# Patient Record
Sex: MALE | Race: WHITE | URBAN - METROPOLITAN AREA
[De-identification: names, ages, dates, MRNs, and addresses within clinical notes are randomized per-mention and may not be internally consistent; named-entity substitution may affect disease eponyms.]

---

## 2019-09-30 ENCOUNTER — OFFICE VISIT (OUTPATIENT)
Dept: SURGERY | Facility: CLINIC | Age: 59
End: 2019-09-30
Payer: COMMERCIAL

## 2019-09-30 VITALS
TEMPERATURE: 99 F | HEIGHT: 72 IN | WEIGHT: 204 LBS | DIASTOLIC BLOOD PRESSURE: 85 MMHG | HEART RATE: 63 BPM | BODY MASS INDEX: 27.63 KG/M2 | SYSTOLIC BLOOD PRESSURE: 139 MMHG

## 2019-09-30 DIAGNOSIS — L29.0 PRURITUS ANI: ICD-10-CM

## 2019-09-30 DIAGNOSIS — K64.2 PROLAPSED INTERNAL HEMORRHOIDS, GRADE 3: Primary | ICD-10-CM

## 2019-09-30 PROCEDURE — 99243 OFF/OP CNSLTJ NEW/EST LOW 30: CPT | Performed by: COLON & RECTAL SURGERY

## 2019-09-30 PROCEDURE — 46600 DIAGNOSTIC ANOSCOPY SPX: CPT | Performed by: COLON & RECTAL SURGERY

## 2019-09-30 NOTE — PATIENT INSTRUCTIONS
Jason Baez presents today in consultation of Dr. Roma Hall for perianal itching and hemorrhoids. The patient states that approximately 1-1/2 years ago he began to develop perianal itching.   He states that it has become progressively more severe and fr hemorrhoids that are likely contributing to the perianal itching. It was also discussed with the patient that his diet likely is a component to his perianal itching as well. The patient was provided a pruritus a night patient education sheet.   That she

## 2019-10-01 NOTE — PROCEDURES
Procedure:  Anoscopy    Surgeon: Jericho Sarah    Anesthesia: None    Findings: See the progress note attached for all findings    Operative Summary: The patient was placed in a prone position on the proctoscopy table, the hips were flexed in the jackknife p

## 2019-11-04 ENCOUNTER — OFFICE VISIT (OUTPATIENT)
Dept: SURGERY | Facility: CLINIC | Age: 59
End: 2019-11-04
Payer: COMMERCIAL

## 2019-11-04 VITALS
TEMPERATURE: 98 F | BODY MASS INDEX: 27.63 KG/M2 | SYSTOLIC BLOOD PRESSURE: 145 MMHG | WEIGHT: 204 LBS | HEIGHT: 72 IN | DIASTOLIC BLOOD PRESSURE: 81 MMHG | HEART RATE: 57 BPM

## 2019-11-04 DIAGNOSIS — L29.0 PRURITUS ANI: Primary | ICD-10-CM

## 2019-11-04 DIAGNOSIS — K64.2 PROLAPSED INTERNAL HEMORRHOIDS, GRADE 3: ICD-10-CM

## 2019-11-04 PROCEDURE — 99213 OFFICE O/P EST LOW 20 MIN: CPT | Performed by: COLON & RECTAL SURGERY

## 2019-11-04 NOTE — PATIENT INSTRUCTIONS
I am seeing this patient in further consultation regarding advanced pruritus ani identified on prior examination. The patient also had grade 3 internal hemorrhoids.     I am happy to report that with topical treatment, hygiene, and dietary modifications, t

## 2019-11-04 NOTE — PROGRESS NOTES
Follow Up Visit Note       Active Problems      1. Pruritus ani    2.  Prolapsed internal hemorrhoids, grade 3          Chief Complaint   Patient presents with:  Anal Problem (GI): cont care for puritis ani - states that symptoms have been better since the reviewed by me during today. Review of Systems   Constitutional: Negative for chills, diaphoresis, fatigue, fever and unexpected weight change. HENT: Negative for hearing loss, nosebleeds, sore throat and trouble swallowing.     Respiratory: Negative for current itching, diarrhea or constipation. We were contemplating rubber band treatment to his hemorrhoids if his symptoms did not resolve. Clinical examination reveals the skin of the anus to be completely healed.   There are no signs of pruritus ani

## 2022-08-30 NOTE — H&P
New Patient Visit Note       Active Problems      1. Prolapsed internal hemorrhoids, grade 3    2.  Pruritus ani        Chief Complaint   Patient presents with:  Hemorrhoids: NP hemorrhoid consult, pt denies pain or bleeding, wnl bm      History of Present history, independently performed a physical exam, and developed an assessment and plan. The physician performed all medical decision making.     Mary Kay Villafana PA-C    I agree with the note as scribed by the PA  I performed my own physical exam and obtai urgency. Musculoskeletal: Negative for arthralgias and myalgias. Skin: Negative for color change and rash. Neurological: Negative for tremors, syncope and weakness. Hematological: Negative for adenopathy. Does not bruise/bleed easily.    Psychiatric Procedures:  Anoscopy    Clinical examination of the rectum including anoscopy reveals no external hemorrhoids or fissures. The patient does have circumferential grade 3 internal hemorrhoids. There is no evidence of any Crohn's disease or proctitis.   Pro having any pain with bowel movements. The patient has no other significant past surgical history. The patient has no significant past medical history.     The patient denies any first or second-degree family history of colorectal cancer or uterine cance 0

## 2023-12-18 NOTE — LETTER
19    Patient: Rj Dejesus  : 1960 Visit date: 2019    Dear  Dr. Raine Leach,    Thank you for referring Rj Dejesus to my practice. Please find my assessment and plan below.              Assessment   Prolapsed internal hemorrhoids, g Subjective   Patient ID: Kentrell Munguia is a 65 y.o. male with a history of hypertension, DAVID, pulmonary nodules, current cigarette smoker, dyslipidemia, and prostate cancer who presents for Follow-up    HPI the patient's blood pressure is well-controlled which is in between 120-130/70-80 mmHg as reported by the patient.    He had CT chest low-dose a month ago which is stable.  He smokes 1 pack in 3-4 days.  Has upcoming appointment with pulmonologist in 3 weeks.  He is on a low-carb diet and has lost a few pounds.  He does not monitor his blood glucose.  The patient stopped taking tamsulosin stating that it was never refilled by his urology and he thought he does not need to take it anymore.  He wakes up to urinate once at night.  Denies hematuria.    Review of Systems   Constitutional:  Negative for appetite change, chills, fatigue and fever.   HENT:  Negative for congestion, ear pain, facial swelling, hearing loss, nosebleeds and sore throat.    Eyes:  Negative for pain, discharge and visual disturbance.   Respiratory:  Negative for cough, choking, chest tightness, shortness of breath and wheezing.    Cardiovascular:  Negative for chest pain, palpitations and leg swelling.   Gastrointestinal:  Negative for abdominal distention, abdominal pain, anal bleeding, constipation, diarrhea, nausea and vomiting.   Endocrine: Negative for cold intolerance, heat intolerance, polydipsia, polyphagia and polyuria.   Genitourinary:  Negative for difficulty urinating, frequency, hematuria and urgency.   Musculoskeletal:  Negative for arthralgias, gait problem, joint swelling and myalgias.   Skin:  Negative for color change and rash.   Neurological:  Negative for dizziness, tremors, syncope, weakness, numbness and headaches.   Psychiatric/Behavioral:  Negative for behavioral problems, confusion, sleep disturbance and suicidal ideas. The patient is not nervous/anxious.        Objective   /60   Temp 36.1 °C (96.9 °F)   Ht  "1.74 m (5' 8.5\")   Wt 99.8 kg (220 lb)   BMI 32.96 kg/m²     Physical Exam  Constitutional:       General: He is not in acute distress.     Appearance: Normal appearance.   HENT:      Head: Normocephalic and atraumatic.      Nose: Nose normal.   Eyes:      Extraocular Movements: Extraocular movements intact.      Conjunctiva/sclera: Conjunctivae normal.      Pupils: Pupils are equal, round, and reactive to light.   Cardiovascular:      Rate and Rhythm: Normal rate and regular rhythm.      Pulses: Normal pulses.      Heart sounds: Normal heart sounds.      Comments: Varicose veins of lower extremities b/l  Pulmonary:      Effort: Pulmonary effort is normal.      Breath sounds: Normal breath sounds.   Abdominal:      General: Bowel sounds are normal.      Palpations: Abdomen is soft.   Musculoskeletal:         General: Normal range of motion.      Cervical back: Normal range of motion and neck supple.   Neurological:      General: No focal deficit present.      Mental Status: He is alert and oriented to person, place, and time.   Psychiatric:         Mood and Affect: Mood normal.         Behavior: Behavior normal.         Thought Content: Thought content normal.         Judgment: Judgment normal.         Assessment/Plan     High blood pressure   Blood pressure is well controlled  Continue doxazosin 4 mg at bedtime  Continue amlodipine 10 mg once daily   Continue losartan-HCTZ 100-12.5 mg daily  No added salt diet, do not add salt to your food   Try to exercise every day for 30 minutes    Prediabetes  Last hemoglobin A1c 5.7  Continue low-carb diet  Try to exercise 30 minutes daily     Benign bone lesion of left hip  continue surveillance   Follow up with orthopedic oncology Dr. Hillman     Smoking addiction  Smokes 1 pack in 3-4 days  CT chest low-dose 11/20/2023, stable pulmonary nodules  ultrasound abdominal aorta normal      Pulmonary nodules  stable  Repeat CT chest in November 2023  Follow up with pulmonology as " Clinical examination of the rectum including anoscopy reveals no external hemorrhoids or fissures. The patient does have circumferential grade 3 internal hemorrhoids. There is no evidence of any Crohn's disease or proctitis.   Prostate is normal.  There a scheduled     Obstructive sleep apnea  APAP, declined     Prostate cancer  stable  Off of Tamsulosin 0.4 mg daily  Follow up with urology as scheduled     CAD  Continue Rosuvastatin 20 mg at bed time  Continue Aspirin 81 mg daily  Try to lose weight  Exercise at least 30 minutes daily  Low-fat diet recommended  Stress test in 2020 normal  Follow-up with cardiology Dr. Acevedo      Hyperlipidemia  Continue Rosuvastatin 20 mg at bed time  low fat, low cholesterol diet  I recommend Mediterranean diet, which include fish, chicken, vegetables and olive oil  Exercise daily for 30 minutes daily or at least 3 times a week     Elevated hematocrit and red blood cells  Most likely due to smoking  Patient is not interested in quitting smoking  keep monitoring  We have obtained CBC today     Multiple colon polyps  Colonoscopy done in May 2021, repeat colonoscopy in 3 years    Continue multivitamins    BMI 33.86 kg/m²  Try to have as many home cooked meals as possible  Avoid processed foods which contain excess calories, sugar, and sodium.  Exercise 30 minutes daily    Health maintenance  Colonoscopy May 2021, repeat colonoscopy in 3 years  Prevnar 13 done in 2021, Prevnar 20 recommended  Shingrix recommended, have it done at the pharmacy  See your dentist twice a year  Yearly eye exam  see dermatology once a year for a skin check.    We obtained fasting blood work  I will call with results  Follow-up in 3 months

## (undated) NOTE — LETTER
19    Patient: Faye Akbar  : 1960 Visit date: 2019    Dear  Dr. Odette Carrion,    Thank you for referring Faye Akbar to my practice. Please find my assessment and plan below.         Assessment   Pruritus ani  (primary encounter d